# Patient Record
Sex: FEMALE | Race: WHITE
[De-identification: names, ages, dates, MRNs, and addresses within clinical notes are randomized per-mention and may not be internally consistent; named-entity substitution may affect disease eponyms.]

---

## 2019-10-18 ENCOUNTER — HOSPITAL ENCOUNTER (OUTPATIENT)
Dept: HOSPITAL 76 - LAB | Age: 69
Discharge: HOME | End: 2019-10-18
Attending: SURGERY
Payer: MEDICARE

## 2019-10-18 DIAGNOSIS — D49.0: Primary | ICD-10-CM

## 2019-10-18 DIAGNOSIS — R91.8: ICD-10-CM

## 2019-10-18 DIAGNOSIS — K76.89: ICD-10-CM

## 2019-10-18 LAB
CREAT SERPLBLD-SCNC: 0.7 MG/DL (ref 0.4–1)
GFRSERPLBLD MDRD-ARVRAT: 83 ML/MIN/{1.73_M2} (ref 89–?)

## 2019-10-18 PROCEDURE — 36415 COLL VENOUS BLD VENIPUNCTURE: CPT

## 2019-10-18 PROCEDURE — 74177 CT ABD & PELVIS W/CONTRAST: CPT

## 2019-10-18 PROCEDURE — 82565 ASSAY OF CREATININE: CPT

## 2019-10-18 PROCEDURE — 71260 CT THORAX DX C+: CPT

## 2019-10-18 NOTE — CT REPORT
Reason:  ESOPHAGEAL MASS

Procedure Date:  10/18/2019   

Accession Number:  [id number] / [id number]                    

Procedure:  CT  - CHEST W CPT Code:  

 

FULL RESULT:

 

 

EXAM:

CT CHEST

 

EXAM DATE: 10/18/2019 05:17 PM.

 

CLINICAL HISTORY: Esophageal mass.

 

COMPARISONS: None.

 

TECHNIQUE: Routine helical CT imaging was performed through the chest. IV 

contrast: 100 mL Optiray 320. Reconstructions: Coronal and sagittal.

 

In accordance with CT protocol optimization, one or more of the following 

dose reduction techniques were utilized for this exam: automated exposure 

control, adjustment of mA and/or KV based on patient size, or use of 

iterative reconstructive technique.

 

FINDINGS:

Lungs/Pleura: There is a 4 mm peripheral right lower lobe nodule series 3 

image 161. A 6 mm subpleural nodule is also seen on image 192. No 

confluent lung consolidation. No pleural effusion or pneumothorax.

 

Mediastinum: Normal heart size. No pericardial effusion. No mediastinal 

or hilar lymphadenopathy. There is an eccentric 5.2 x 2.8 x 4.7 cm mass 

involving the right lateral margin of the distal esophagus and 

gastroesophageal junction. No esophageal obstruction seen. Postoperative 

changes seen involving the stomach including gastroesophageal junction.

 

Bones: Unremarkable.

 

Visualized Abdomen: Please refer to abdomen CT report.

 

Other: None.

 

IMPRESSION:

1. 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

2. No mediastinal lymphadenopathy.

3. There are 2 subcentimeter peripheral right lung nodules. Given 

esophageal findings, consider follow-up chest CT in 3-6 months.

 

SAJI

 

The call report notification system was initiated by Dr. Jalil Moncada at 06:23 PM on 10/18/2019.

ADDENDUM: 10/18/19 18:49

 

The above call report findings were discussed with Dr. Soto by Dr. Jalil Moncada at 06:49 PM on 10/18/2019.

## 2019-10-18 NOTE — CT REPORT
Reason:  ESOPHAGEAL MASS

Procedure Date:  10/18/2019   

Accession Number:  [id number] / [id number]                    

Procedure:  CT  - Abdomen/Pelvis W CPT Code:  

 

FULL RESULT:

 

 

EXAM:

CT ABDOMEN AND PELVIS

 

EXAM DATE: 10/18/2019 05:17 PM.

 

CLINICAL HISTORY: Esophageal mass.

 

COMPARISONS: None.

 

TECHNIQUE: Routine helical CT imaging was performed through the abdomen 

and pelvis. IV contrast: Optiray 320 100 mL. Enteric contrast: No. 

Reconstructions: Coronal and sagittal.

 

In accordance with CT protocol optimization, one or more of the following 

dose reduction techniques were utilized for this exam: automated exposure 

control, adjustment of mA and/or KV based on patient size, or use of 

iterative reconstructive technique.

 

FINDINGS:

Lung Bases: There is eccentric mural thickening involving the distal 

esophagus up to the gastroesophageal junction. Postoperative changes 

noted secondary to gastric reduction surgery.

 

Liver: Several benign-appearing liver cysts measuring up to 3.7 cm in the 

left hepatic lobe. No suspicious liver mass.

 

Gallbladder/Bile Ducts: Unremarkable.

 

Spleen: Normal.

 

Pancreas: Normal.

 

Adrenal Glands: Normal.

 

Kidneys: There is an 8 mm right renal cortical cyst. The kidneys are 

otherwise unremarkable. No retroperitoneal lymphadenopathy.

 

Peritoneal Cavity/Bowel: Normal. No free fluid, free air or adenopathy. 

No masses or acute inflammatory process. No evidence of appendicitis.

 

Pelvic Organs: Normal. The bladder and visualized pelvic organs are 

within normal limits.

 

Vasculature: Atherosclerotic aorta without evidence of aneurysm.

 

Bones: No significant abnormality.

 

Other: None.

IMPRESSION:

1. Eccentric neoplasm involving the distal esophagus concerning for 

neoplasm.

2. Benign-appearing left hepatic lobe cysts.

3. No evidence of abdominal pelvic metastases.

 

RADIA

 

The call report notification system was initiated by Dr. Jalil Moncada at 06:29 PM on 10/18/2019.

 

The above call report findings were discussed with Dr. Soto by Dr. Jalil Moncada at 06:35 PM on 10/18/2019.

## 2020-03-14 ENCOUNTER — HOSPITAL ENCOUNTER (EMERGENCY)
Dept: HOSPITAL 76 - ED | Age: 70
Discharge: HOME | End: 2020-03-14
Payer: MEDICARE

## 2020-03-14 VITALS — DIASTOLIC BLOOD PRESSURE: 64 MMHG | SYSTOLIC BLOOD PRESSURE: 116 MMHG

## 2020-03-14 DIAGNOSIS — Z45.2: Primary | ICD-10-CM

## 2020-03-14 DIAGNOSIS — Z87.891: ICD-10-CM

## 2020-03-14 DIAGNOSIS — I10: ICD-10-CM

## 2020-03-14 PROCEDURE — 99281 EMR DPT VST MAYX REQ PHY/QHP: CPT

## 2020-03-14 NOTE — ED PHYSICIAN DOCUMENTATION
History of Present Illness





- Stated complaint


Stated Complaint: PORT DEACCESSED





- Chief complaint


Chief Complaint: General





- History obtained from


History obtained from: Patient





- History of Present Illness


Timing: Today


Pain level max: 0


Pain level now: 0





- Additonal information


Additional information: 





69-year-old female states that she had an infusion in her port yesterday. She 

states that it was not de-accessed at the end of the infusion.  Here to have it 

de-accessed.  No other complaints





Review of Systems


Constitutional: denies: Fever





PD PAST MEDICAL HISTORY





- Past Medical History


Cardiovascular: Hypertension


Respiratory: Asthma


Neuro: None


Endocrine/Autoimmune: Other (Hx of DM 2 but resolved after gastric sleeve 5 yea

rs ago)


GI: Other (gastric sleeve years ago without problems. )


: None


Musculoskeletal: None


Derm: None





- Past Surgical History


General: Bowel surgery, Gastric surgery (Gastric sleeve approximately 5 years 

ago. She has lost 130 pounds)


Ortho: Spine surgery





- Present Medications


Home Medications: 


                                Ambulatory Orders











 Medication  Instructions  Recorded  Confirmed


 


Buspirone HCl 10 mg PO DAILY 09/01/19 09/01/19


 


Gabapentin 300 mg PO QPM 09/01/19 09/01/19


 


Pantoprazole Sodium [Protonix] 40 mg PO QDAC 09/01/19 09/01/19


 


Tramadol HCl 25 mg PO DAILY PRN 09/01/19 09/01/19


 


raNITIdine HCL [Ranitidine HCl] 150 mg PO DAILY 09/01/19 09/01/19














- Allergies


Allergies/Adverse Reactions: 


                                    Allergies











Allergy/AdvReac Type Severity Reaction Status Date / Time


 


No Known Drug Allergies Allergy   Verified 03/14/20 14:06














- Social History


Does the pt smoke?: No


Smoking Status: Former smoker


Does the pt drink ETOH?: Yes


Does the pt have substance abuse?: No





PD ED PE NORMAL





- Vitals


Vital signs reviewed: Yes





- General


General: Alert and oriented X 3, No acute distress





- HEENT


HEENT: Moist mucous membranes





- Derm


Derm: Warm and dry, Other (Port in right upper chest without signs of infection)





- Neuro


Neuro: Alert and oriented X 3





Results





- Vitals


Vitals: 





                               Vital Signs - 24 hr











  03/14/20





  14:06


 


Temperature 36.7 C


 


Heart Rate 90


 


Respiratory 14





Rate 


 


Blood Pressure 116/64


 


O2 Saturation 98








                                     Oxygen











O2 Source                      Room air

















PD MEDICAL DECISION MAKING





- ED course


Complexity details: considered differential, d/w patient


ED course: 





Port was de-accessed.  Patient tolerated well.  No signs of infection.





This document was made in part using voice recognition software. While efforts 

are made to proofread this document, sound alike and grammatical errors may 

occur.





Departure





- Departure


Disposition: 01 Home, Self Care


Clinical Impression: 


 Encounter for care related to vascular access port





Condition: Good


Instructions:  ED PICC Line Care


Follow-Up: 


your,doctor as needed. [Other]


Comments: 


Your port was de-accessed. Return if you worsen.